# Patient Record
Sex: FEMALE | Race: WHITE | NOT HISPANIC OR LATINO | Employment: FULL TIME | URBAN - METROPOLITAN AREA
[De-identification: names, ages, dates, MRNs, and addresses within clinical notes are randomized per-mention and may not be internally consistent; named-entity substitution may affect disease eponyms.]

---

## 2021-01-22 ENCOUNTER — EVALUATION (OUTPATIENT)
Dept: PHYSICAL THERAPY | Facility: CLINIC | Age: 61
End: 2021-01-22
Payer: COMMERCIAL

## 2021-01-22 DIAGNOSIS — M54.16 LUMBAR RADICULOPATHY: Primary | ICD-10-CM

## 2021-01-22 DIAGNOSIS — Z98.890 S/P DISCECTOMY: ICD-10-CM

## 2021-01-22 PROCEDURE — 97110 THERAPEUTIC EXERCISES: CPT | Performed by: PHYSICAL THERAPIST

## 2021-01-22 PROCEDURE — 97162 PT EVAL MOD COMPLEX 30 MIN: CPT | Performed by: PHYSICAL THERAPIST

## 2021-01-22 NOTE — PROGRESS NOTES
PT Evaluation    Today's date: 2021   Patient name: Jf York  : 1960  MRN: [id number]  Referring provider: Narda Pino MD  Dx:   Encounter Diagnosis     ICD-10-CM    1  Lumbar radiculopathy  M54 16    2  S/P discectomy  Z98 890            Subjective Evaluation     History of Present Illness    Patient presents with c/o LBP after having a laminectomy at L5-S1 after having a herniated disc and had L posterior leg pain 2 years ago  The surgery did help then this time  Then she had pain and was laid up in bed for 3 weeks on  and then had a discectomy on 20  It was coming on at work and it began to feel like a mary jo horse  She thinks it may be from doing a lot of work at home as her  is disabled  She helps him with transfers as he has prosthetic leg  It went from the glut to the 4th toe and it felt like someone was ripping her toe off  After grocery shopping her leg will feel weak and has to drag it  She has not been back to work since   Precautions: all exercises in spine neutral position although she was instructed by MD to perform prone Press up exercises and has been doing this  Neuro signs: numbness and tingling  Bowel and bladder sxs: none  Red flags: none  Occupation: medical billing      Pain  At best pain ratin  At worst pain ratin  Location: L glut to post leg to her 4th toe  Quality: rubber band around leg  Relieving factors: advil, press up, elevate legs, reclining  Aggravating factors:sitting, walking grocery store, putting dishes in [address] [place] [address] at home c her  who is disabled- has to help shower      Patient Goals  Patient goals for therapy: To help her leg strength after atrophy, walk in grocery store and in the field s fatigue  STGs  1  Decrease pain by 20% in 2-4 weeks to improve standing tolerance  2  Improve lumbar ROM by 10 degrees in 2-4 weeks to improve sit to stand     3  Improve hip/quad strength by 1/3 grade in 2-4 weeks to improve stairs performance to step through pattern  LTGs  1  Decrease pain by 60% in 6-8 weeks  2  Improve walking tolerance to >30 minutes in 6-8 weeks to perform community ambulation  3  Perform job sitting/standing activities independently without pain in 6-8 weeks  4  Improve stairs tolerance to 2 flight  in 8 weeks  Objective Measurements:    Observation: incision healed but does feel a bit firm to the touch  Heel/toe walk:  Balance: L SLS: LLE buckled into knee flexion  Gait: Decreased WB on LLE  Squat: pain in L thigh knees over toes  Reflexes:   Sensation: WFL  Myotomes:WFL except L3      Slump: 5 deg SLR:  John: - felt better Faddir: + L Scours:-         Log Roll:-  Hip distraction: Felt better  Elys:-    ZAIRA:hip glides not assessed  Palpation:WFL    Lumbar ROM L R Strength knee L R   Flex    Flex 4- 4+   Ext   Ext 3+ shaky 4+   Rot        SB        Hip A/PROM        Flex  10x post hip pain /  / Flex 4 4   ER at 90 35 pain in calf  ER     IR at 90 20 pain in calf  IR     Abd   Abd 4    Ext   Ext             Knee A/PROM   Ankle     Flex   DF 4+    Ext   PF 10x          Assessment:    Sandeep Aguilar is a pleasant 63 y o  female who presents with referring diagnosis of lumbar radiculopathy after lumbar discectomy  The patient's greatest concern is getting back to physical function and helping her   No further referral appears necessary at this time based upon examination results  The primary movement impairment diagnosis is lumbar ext derangement causing weakness in L quad resulting in pathoanatomical symptoms of lumbar radiculopathy and limiting her ability to sit for prolonged times  Impairments include:  1) Decreased lumbar ROM  2) Decreased knee strength   3) Decreased Decreased balance     Etiologic factors include post surgical     Negative prognostic indicators: multiple occurences  Positive prognostic indicators: good attitude   Please contact me if you have any further questions or recommendations  Thank you very much for the kind referral         Plan  Patient would benefit from:Skilled physical therapy  Planned therapy interventions: manual therapy, neuromuscular re-education, stretching, strengthening, therapeutic activities, therapeutic exercise, patient education, home exercise program, and activity modification  Frequency: 2x week  Duration in weeks: 8  Treatment plan discussed with: patient             Goals: Patient's goal is To help her leg strength after atrophy, walk in grocery store and in the field s fatigue        Precautions: lumbar discectomy- 12/16/20  Dx: lumbar ext derangement- L quad weakness      Daily Treatment Diary     Manuals 1/22/2021        L hip ext PROM                                    Ther Ex         Standing lumbar ext 10x        Prone press up 10x        bike         Standing knee flex         s                                             Neuro Re-ed         saq hep        laq- when rebeca         TKE L BTB         SLR 5x                          Slb- 1 UE         TA contraction         Ther Activity         stairs nv        Sit to stand                  Gait Training                           Modalities

## 2021-01-22 NOTE — LETTER
2021    [id number]  [facility] [address]     Patient: Kim Munoz   YOB: 1960   Date of Visit: 2021     Encounter Diagnosis     ICD-10-CM    1  Lumbar radiculopathy  M54 16    2  S/P discectomy  Z98 890        Dear Dr [facility]ANTU [facility] [place]:    Thank you for your recent referral of Kim Munoz  Please review the attached evaluation summary from Analia's recent visit  Please verify that you agree with the plan of care by signing the attached order  If you have any questions or concerns, please do not hesitate to call  I sincerely appreciate the opportunity to share in the care of one of your patients and hope to have another opportunity to work with you in the near future  Sincerely,    Bora Gibson, PT      Referring Provider:      I certify that I have read the below Plan of Care and certify the need for these services furnished under this plan of treatment while under my care  MD [id number]  [place]  [phone]  Via Fax: [phone]          PT Evaluation    Today's date: 2021   Patient name: Kim Munoz  : 1960  MRN: [id number]  Referring provider: Pablo Oden MD  Dx:   Encounter Diagnosis     ICD-10-CM    1  Lumbar radiculopathy  M54 16    2  S/P discectomy  Z98 890            Subjective Evaluation     History of Present Illness    Patient presents with c/o LBP after having a laminectomy at L5-S1 after having a herniated disc and had L posterior leg pain 2 years ago  The surgery did help then this time  Then she had pain and was laid up in bed for 3 weeks on  and then had a discectomy on 20  It was coming on at work and it began to feel like a mary jo horse  She thinks it may be from doing a lot of work at home as her  is disabled  She helps him with transfers as he has prosthetic leg   It went from the glut to the 4th toe and it felt like someone was ripping her toe off  After grocery shopping her leg will feel weak and has to drag it  She has not been back to work since   Precautions: all exercises in spine neutral position although she was instructed by MD to perform prone Press up exercises and has been doing this  Neuro signs: numbness and tingling  Bowel and bladder sxs: none  Red flags: none  Occupation: medical billing      Pain  At best pain ratin  At worst pain ratin  Location: L glut to post leg to her 4th toe  Quality: rubber band around leg  Relieving factors: advil, press up, elevate legs, reclining  Aggravating factors:sitting, walking grocery store, putting dishes in [address] [place] [address] at home c her  who is disabled- has to help shower      Patient Goals  Patient goals for therapy: To help her leg strength after atrophy, walk in grocery store and in the field s fatigue  STGs  1  Decrease pain by 20% in 2-4 weeks to improve standing tolerance  2  Improve lumbar ROM by 10 degrees in 2-4 weeks to improve sit to stand  3  Improve hip/quad strength by 1/3 grade in 2-4 weeks to improve stairs performance to step through pattern  LTGs  1  Decrease pain by 60% in 6-8 weeks  2  Improve walking tolerance to >30 minutes in 6-8 weeks to perform community ambulation  3  Perform job sitting/standing activities independently without pain in 6-8 weeks  4  Improve stairs tolerance to 2 flight  in 8 weeks         Objective Measurements:    Observation: incision healed but does feel a bit firm to the touch  Heel/toe walk:  Balance: L SLS: LLE buckled into knee flexion  Gait: Decreased WB on LLE  Squat: pain in L thigh knees over toes  Reflexes:   Sensation: WFL  Myotomes:WFL except L3      Slump: 5 deg SLR:  John: - felt better Faddir: + L Scours:-         Log Roll:-  Hip distraction: Felt better  Elys:-    ZAIRA:hip glides not assessed  Palpation:WFL    Lumbar ROM L R Strength knee L R   Flex Flex 4- 4+   Ext   Ext 3+ shaky 4+   Rot        SB        Hip A/PROM        Flex  10x post hip pain /  / Flex 4 4   ER at 90 35 pain in calf  ER     IR at 90 20 pain in calf  IR     Abd   Abd 4    Ext   Ext             Knee A/PROM   Ankle     Flex   DF 4+    Ext   PF 10x          Assessment:    Marino Dowell is a pleasant 63 y o  female who presents with referring diagnosis of lumbar radiculopathy after lumbar discectomy  The patient's greatest concern is getting back to physical function and helping her   No further referral appears necessary at this time based upon examination results  The primary movement impairment diagnosis is lumbar ext derangement causing weakness in L quad resulting in pathoanatomical symptoms of lumbar radiculopathy and limiting her ability to sit for prolonged times  Impairments include:  1) Decreased lumbar ROM  2) Decreased knee strength   3) Decreased Decreased balance     Etiologic factors include post surgical     Negative prognostic indicators: multiple occurences  Positive prognostic indicators: good attitude  Please contact me if you have any further questions or recommendations  Thank you very much for the kind referral         Plan  Patient would benefit from:Skilled physical therapy  Planned therapy interventions: manual therapy, neuromuscular re-education, stretching, strengthening, therapeutic activities, therapeutic exercise, patient education, home exercise program, and activity modification  Frequency: 2x week  Duration in weeks: 8  Treatment plan discussed with: patient             Goals: Patient's goal is To help her leg strength after atrophy, walk in grocery store and in the field s fatigue        Precautions: lumbar discectomy- 12/16/20  Dx: lumbar ext derangement- L quad weakness      Daily Treatment Diary     Manuals 1/22/2021        L hip ext PROM                                    Ther Ex         Standing lumbar ext 10x        Prone press up 10x bike         Standing knee flex         s                                             Neuro Re-ed         saq hep        laq- when rebeca         TKE L BTB         SLR 5x                          Slb- 1 UE         TA contraction         Ther Activity         stairs nv        Sit to stand                  Gait Training                           Modalities

## 2021-01-25 ENCOUNTER — TRANSCRIBE ORDERS (OUTPATIENT)
Dept: PHYSICAL THERAPY | Facility: CLINIC | Age: 61
End: 2021-01-25

## 2021-01-25 DIAGNOSIS — M54.16 RADICULOPATHY, LUMBAR REGION: Primary | ICD-10-CM

## 2021-01-26 ENCOUNTER — APPOINTMENT (OUTPATIENT)
Dept: PHYSICAL THERAPY | Facility: CLINIC | Age: 61
End: 2021-01-26
Payer: COMMERCIAL

## 2021-01-28 ENCOUNTER — OFFICE VISIT (OUTPATIENT)
Dept: PHYSICAL THERAPY | Facility: CLINIC | Age: 61
End: 2021-01-28
Payer: COMMERCIAL

## 2021-01-28 DIAGNOSIS — Z98.890 S/P DISCECTOMY: Primary | ICD-10-CM

## 2021-01-28 DIAGNOSIS — M54.16 LUMBAR RADICULOPATHY: ICD-10-CM

## 2021-01-28 PROCEDURE — 97530 THERAPEUTIC ACTIVITIES: CPT | Performed by: PHYSICAL THERAPIST

## 2021-01-28 PROCEDURE — 97140 MANUAL THERAPY 1/> REGIONS: CPT | Performed by: PHYSICAL THERAPIST

## 2021-01-28 PROCEDURE — 97112 NEUROMUSCULAR REEDUCATION: CPT | Performed by: PHYSICAL THERAPIST

## 2021-01-28 PROCEDURE — 97110 THERAPEUTIC EXERCISES: CPT | Performed by: PHYSICAL THERAPIST

## 2021-01-28 NOTE — PROGRESS NOTES
Daily Note     Today's date: 2021  Patient name: Deonte Martinez  : 1960  MRN: [id number]  Referring provider: Zain Sow MD  Dx:   Encounter Diagnosis     ICD-10-CM    1  S/P discectomy  Z98 890    2  Lumbar radiculopathy  M54 16                   Subjective: She states she will be going back to work on   She has been trying exercises which have helped leg pain but make the back a bit sore  Objective: See treatment diary below  L abd: 3-    Assessment: Tolerated treatment well  She did have a spasm in the piriformis after 3 reps of clamshells so unable to perform  SLR was still shaky  She did have fatigue after today's session  Patient would benefit from continued PT      Plan: Continue per plan of care  Goals: Patient's goal is To help her leg strength after atrophy, walk in grocery store and in the field s fatigue        Precautions: lumbar discectomy- 20  Dx: lumbar ext derangement- L quad/HS weakness      Daily Treatment Diary     Manuals 2021       L hip ext PROM  8'                                  Ther Ex         Standing lumbar ext 10x 2x10       Prone press up 10x 2x10       bike  6'       Standing knee flex  20x                                                    Neuro Re-ed         saq hep 20x       laq- when rebeca  10x       TKE L BTB  20x        SLR 5x 2x5       Hip abd slides  2x10       Hip add iso  10x :05       Slb- 1 UE  10x :05       TA contraction  20x :05       Ther Activity         stairs nv 1 flight       Sit to stand  2x10                Gait Training                           Modalities

## 2021-02-02 ENCOUNTER — APPOINTMENT (OUTPATIENT)
Dept: PHYSICAL THERAPY | Facility: CLINIC | Age: 61
End: 2021-02-02
Payer: COMMERCIAL

## 2021-02-03 ENCOUNTER — OFFICE VISIT (OUTPATIENT)
Dept: PHYSICAL THERAPY | Facility: CLINIC | Age: 61
End: 2021-02-03
Payer: COMMERCIAL

## 2021-02-03 DIAGNOSIS — Z98.890 S/P DISCECTOMY: Primary | ICD-10-CM

## 2021-02-03 DIAGNOSIS — M54.16 LUMBAR RADICULOPATHY: ICD-10-CM

## 2021-02-03 PROCEDURE — 97140 MANUAL THERAPY 1/> REGIONS: CPT

## 2021-02-03 PROCEDURE — 97110 THERAPEUTIC EXERCISES: CPT

## 2021-02-03 PROCEDURE — 97530 THERAPEUTIC ACTIVITIES: CPT

## 2021-02-03 PROCEDURE — 97112 NEUROMUSCULAR REEDUCATION: CPT

## 2021-02-03 NOTE — PROGRESS NOTES
Daily Note     Today's date: 2/3/2021  Patient name: Neema Ramos  : 1960  MRN: [id number]  Referring provider: Nidia Hill MD  Dx:   Encounter Diagnosis     ICD-10-CM    1  S/P discectomy  Z98 890    2  Lumbar radiculopathy  M54 16        Start Time: 1630  Stop Time: 1722  Total time in clinic (min): 52 minutes    Subjective: Pt reports that she worked today and used her standing desk  Pt had no soreness following last session  Objective: See treatment diary below      Assessment: Tolerated treatment well  Was able to progress with reps this session  Does have most difficulty with SLR  Patient would benefit from continued PT      Plan: Continue per plan of care  Goals: Patient's goal is To help her leg strength after atrophy, walk in grocery store and in the field s fatigue        Precautions: lumbar discectomy- 20  Dx: lumbar ext derangement- L quad/HS weakness      Daily Treatment Diary     Manuals 2021 02/03      L hip ext PROM  8' 8'                                 Ther Ex         Standing lumbar ext 10x 2x10 2x10      Prone press up 10x 2x10 2x10      bike  6' 6'       Standing knee flex  20x 20x                                                   Neuro Re-ed         saq hep 20x 20      laq- when rebeca  10x 20      TKE L BTB  20x        SLR 5x 2x5 4x5       Hip abd slides  2x10 2x10      Hip add iso  10x :05 20x :05      Slb- 1 UE  10x :05 10x :05 B UE      TA contraction  20x :05 20x :05      Ther Activity         stairs nv 1 flight 2 flight       Sit to stand  2x10 2x10               Gait Training                           Modalities

## 2021-02-04 ENCOUNTER — APPOINTMENT (OUTPATIENT)
Dept: PHYSICAL THERAPY | Facility: CLINIC | Age: 61
End: 2021-02-04
Payer: COMMERCIAL

## 2021-02-09 ENCOUNTER — APPOINTMENT (OUTPATIENT)
Dept: PHYSICAL THERAPY | Facility: CLINIC | Age: 61
End: 2021-02-09
Payer: COMMERCIAL

## 2021-02-15 ENCOUNTER — OFFICE VISIT (OUTPATIENT)
Dept: PHYSICAL THERAPY | Facility: CLINIC | Age: 61
End: 2021-02-15
Payer: COMMERCIAL

## 2021-02-15 DIAGNOSIS — M54.16 LUMBAR RADICULOPATHY: ICD-10-CM

## 2021-02-15 DIAGNOSIS — Z98.890 S/P DISCECTOMY: Primary | ICD-10-CM

## 2021-02-15 PROCEDURE — 97140 MANUAL THERAPY 1/> REGIONS: CPT | Performed by: PHYSICAL THERAPIST

## 2021-02-15 PROCEDURE — 97110 THERAPEUTIC EXERCISES: CPT | Performed by: PHYSICAL THERAPIST

## 2021-02-15 PROCEDURE — 97112 NEUROMUSCULAR REEDUCATION: CPT | Performed by: PHYSICAL THERAPIST

## 2021-02-15 NOTE — PROGRESS NOTES
Daily Note     Today's date: 2/15/2021  Patient name: Jimy Kitchen  : 1960  MRN: [id number]  Referring provider: Fayne Spurling, MD  Dx:   Encounter Diagnosis     ICD-10-CM    1  S/P discectomy  Z98 890    2  Lumbar radiculopathy  M54 16                   Subjective: Pt reports she feels like she is back to her initial status of getting tightness in the calf  She still has to take breaks when doing the dishes  In the AM she finds relief c lumbar ext  She is frustrated that she can no longer tie her L shoe  Objective: See treatment diary below      Assessment: Tolerated treatment well  She did have improved hip ROM c lateral hip abd  Does have most difficulty with SLR  Patient would benefit from continued PT      Plan: Continue per plan of care  Goals: Patient's goal is To help her leg strength after atrophy, walk in grocery store and in the field s fatigue        Precautions: lumbar discectomy- 20  Dx: lumbar ext derangement- L quad/HS weakness      Daily Treatment Diary     Manuals 2021 2/15     L hip ext PROM  8' 8' 5'     L hip abd belt mobs    5'                       Ther Ex         Standing lumbar ext 10x 2x10 2x10 2x10     Prone press up 10x 2x10 2x10 2x10     bike  6' 6'  6'     Standing knee flex  20x 20x 20x     Self mob c belt nv                                             Neuro Re-ed         saq hep 20x 20 20x     laq- when rebeca  10x 20 20x     TKE L BTB  20x        SLR 5x 2x5 4x5  2x10     Hip abd slides  2x10 2x10 2x10     Hip add iso  10x :05 20x :05 20x :05     Slb- 1 UE  10x :05 10x :05 B UE 10x : 05     TA contraction  20x :05 20x :05 20x :05     Ther Activity         stairs nv 1 flight 2 flight       Sit to stand  2x10 2x10 2x10              Gait Training                           Modalities

## 2021-02-17 ENCOUNTER — OFFICE VISIT (OUTPATIENT)
Dept: PHYSICAL THERAPY | Facility: CLINIC | Age: 61
End: 2021-02-17
Payer: COMMERCIAL

## 2021-02-17 DIAGNOSIS — Z98.890 S/P DISCECTOMY: Primary | ICD-10-CM

## 2021-02-17 DIAGNOSIS — M54.16 LUMBAR RADICULOPATHY: ICD-10-CM

## 2021-02-17 PROCEDURE — 97112 NEUROMUSCULAR REEDUCATION: CPT

## 2021-02-17 PROCEDURE — 97140 MANUAL THERAPY 1/> REGIONS: CPT

## 2021-02-17 PROCEDURE — 97110 THERAPEUTIC EXERCISES: CPT

## 2021-02-17 NOTE — PROGRESS NOTES
Daily Note     Today's date: 2021  Patient name: Germania Porter  : 1960  MRN: [id number]  Referring provider: Arland Gowers, MD  Dx:   Encounter Diagnosis     ICD-10-CM    1  S/P discectomy  Z98 890    2  Lumbar radiculopathy  M54 16        Start Time: 1500  Stop Time: 1546  Total time in clinic (min): 46 minutes    Subjective: Pt reports that she did not get sore following last visit  Would like to try stairs this visit because she did not have to do them at work today  Objective: See treatment diary below      Assessment: Tolerated treatment well  Did not progress this visit due to pt getting overly sore from visits  Patient would benefit from continued PT      Plan: Continue per plan of care  Goals: Patient's goal is To help her leg strength after atrophy, walk in grocery store and in the field s fatigue        Precautions: lumbar discectomy- 20  Dx: lumbar ext derangement- L quad/HS weakness      Daily Treatment Diary     Manuals 2021 1/28 02/03 2/15 02/17    L hip ext PROM  8' 8' 5' 8'    L hip abd belt mobs    5' WA                      Ther Ex         Standing lumbar ext 10x 2x10 2x10 2x10 2x10     Prone press up 10x 2x10 2x10 2x10 2x10    bike  6' 6'  6' 6'    Standing knee flex  20x 20x 20x 20    Self mob c belt nv                                             Neuro Re-ed         saq hep 20x 20 20x 20x    laq- when rebeca  10x 20 20x 20    TKE L BTB  20x        SLR 5x 2x5 4x5  2x10 2x10    Hip abd slides  2x10 2x10 2x10 2x10     Hip add iso  10x :05 20x :05 20x :05 20x :05    Slb- 1 UE  10x :05 10x :05 B UE 10x : 05 10x :05     TA contraction  20x :05 20x :05 20x :05 20x :05     Ther Activity         stairs nv 1 flight 2 flight   8" 15x     Sit to stand  2x10 2x10 2x10 2x10             Gait Training                           Modalities

## 2021-02-22 ENCOUNTER — APPOINTMENT (OUTPATIENT)
Dept: PHYSICAL THERAPY | Facility: CLINIC | Age: 61
End: 2021-02-22
Payer: COMMERCIAL

## 2021-02-25 ENCOUNTER — OFFICE VISIT (OUTPATIENT)
Dept: PHYSICAL THERAPY | Facility: CLINIC | Age: 61
End: 2021-02-25
Payer: COMMERCIAL

## 2021-02-25 DIAGNOSIS — Z98.890 S/P DISCECTOMY: Primary | ICD-10-CM

## 2021-02-25 DIAGNOSIS — M54.16 LUMBAR RADICULOPATHY: ICD-10-CM

## 2021-02-25 PROCEDURE — 97140 MANUAL THERAPY 1/> REGIONS: CPT | Performed by: PHYSICAL THERAPIST

## 2021-02-25 PROCEDURE — 97110 THERAPEUTIC EXERCISES: CPT

## 2021-02-25 PROCEDURE — 97112 NEUROMUSCULAR REEDUCATION: CPT

## 2021-02-25 PROCEDURE — 97530 THERAPEUTIC ACTIVITIES: CPT

## 2021-02-25 NOTE — PROGRESS NOTES
Daily Note     Today's date: 2021  Patient name: Bryson Aparicio  : 1960  MRN: [id number]  Referring provider: Harper Weir MD  Dx:   Encounter Diagnosis     ICD-10-CM    1  S/P discectomy  Z98 890    2  Lumbar radiculopathy  M54 16        Start Time: 1600  Stop Time: 1656  Total time in clinic (min): 56 minutes     TS manuals [id number]      Subjective: Pt reports that she is feeling much stronger  Pt notes that her walking tolerance has improved  Pt has been maximally compliant with HEP  Objective: See treatment diary below      Assessment: Tolerated treatment well  Pt SLR much improved and no longer shakes or has a lag  Pt able to complete all interventions without c/o fatigue today  Patient would benefit from continued PT      Plan: Continue per plan of care  Goals: Patient's goal is To help her leg strength after atrophy, walk in grocery store and in the field s fatigue        Precautions: lumbar discectomy- 20  Dx: lumbar ext derangement- L quad/HS weakness      Daily Treatment Diary     Manuals 2021 1/28 02/03 2/15 02/17 02/25   L hip ext PROM  8' 8' 5' 8' 5'   L hip abd belt mobs    5' WA TS                     Ther Ex         Standing lumbar ext 10x 2x10 2x10 2x10 2x10  2x10   Prone press up 10x 2x10 2x10 2x10 2x10 2x10   bike  6' 6'  6' 6' 6'    Standing knee flex  20x 20x 20x 20 20   Self mob c belt nv                                             Neuro Re-ed         saq hep 20x 20 20x 20x 20   laq- when rebeca  10x 20 20x 20 20   TKE L BTB  20x     20   SLR 5x 2x5 4x5  2x10 2x10 2x10   Hip abd slides  2x10 2x10 2x10 2x10  2x10   Hip add iso  10x :05 20x :05 20x :05 20x :05 20x :05   Slb- 1 UE  10x :05 10x :05 B UE 10x : 05 10x :05  10x:05   TA contraction  20x :05 20x :05 20x :05 20x :05  20x :05   Ther Activity         stairs nv 1 flight 2 flight   8" 15x  8" 15x   Sit to stand  2x10 2x10 2x10 2x10 2x10            Gait Training                           Modalities

## 2021-04-07 NOTE — PROGRESS NOTES
Discharge Note  Ronold Cowden has made good functional progress with physical therapy  she was educated and updated in her home exercise program  Melvindrew Granados will be discharged from formal therapy due to no further appts scheduled and independence in HEP but will follow up as needed

## 2023-04-06 ENCOUNTER — HOSPITAL ENCOUNTER (EMERGENCY)
Facility: HOSPITAL | Age: 63
Discharge: HOME | End: 2023-04-07
Attending: EMERGENCY MEDICINE

## 2023-04-06 ENCOUNTER — APPOINTMENT (EMERGENCY)
Dept: RADIOLOGY | Facility: HOSPITAL | Age: 63
End: 2023-04-06

## 2023-04-06 VITALS
DIASTOLIC BLOOD PRESSURE: 72 MMHG | TEMPERATURE: 98.2 F | SYSTOLIC BLOOD PRESSURE: 154 MMHG | OXYGEN SATURATION: 98 % | HEART RATE: 60 BPM | RESPIRATION RATE: 20 BRPM

## 2023-04-06 DIAGNOSIS — M79.606 LEG PAIN: ICD-10-CM

## 2023-04-06 DIAGNOSIS — R55 SYNCOPE: Primary | ICD-10-CM

## 2023-04-06 DIAGNOSIS — M54.2 NECK PAIN: ICD-10-CM

## 2023-04-06 LAB
ANION GAP SERPL CALCULATED.3IONS-SCNC: 5 MMOL/L (ref 4–13)
BASOPHILS # BLD AUTO: 0.06 THOUSANDS/ÂΜL (ref 0–0.1)
BASOPHILS NFR BLD AUTO: 1 % (ref 0–1)
BUN SERPL-MCNC: 17 MG/DL (ref 5–25)
CALCIUM SERPL-MCNC: 9.1 MG/DL (ref 8.3–10.1)
CARDIAC TROPONIN I PNL SERPL HS: 4 NG/L
CHLORIDE SERPL-SCNC: 110 MMOL/L (ref 96–108)
CK SERPL-CCNC: 169 U/L (ref 26–192)
CO2 SERPL-SCNC: 23 MMOL/L (ref 21–32)
CREAT SERPL-MCNC: 0.8 MG/DL (ref 0.6–1.3)
EOSINOPHIL # BLD AUTO: 0.1 THOUSAND/ÂΜL (ref 0–0.61)
EOSINOPHIL NFR BLD AUTO: 1 % (ref 0–6)
ERYTHROCYTE [DISTWIDTH] IN BLOOD BY AUTOMATED COUNT: 12.2 % (ref 11.6–15.1)
GFR SERPL CREATININE-BSD FRML MDRD: 78 ML/MIN/1.73SQ M
GLUCOSE SERPL-MCNC: 111 MG/DL (ref 65–140)
HCT VFR BLD AUTO: 36.1 % (ref 34.8–46.1)
HGB BLD-MCNC: 12.2 G/DL (ref 11.5–15.4)
IMM GRANULOCYTES # BLD AUTO: 0.04 THOUSAND/UL (ref 0–0.2)
IMM GRANULOCYTES NFR BLD AUTO: 1 % (ref 0–2)
LYMPHOCYTES # BLD AUTO: 1.65 THOUSANDS/ÂΜL (ref 0.6–4.47)
LYMPHOCYTES NFR BLD AUTO: 19 % (ref 14–44)
MCH RBC QN AUTO: 31.4 PG (ref 26.8–34.3)
MCHC RBC AUTO-ENTMCNC: 33.8 G/DL (ref 31.4–37.4)
MCV RBC AUTO: 93 FL (ref 82–98)
MONOCYTES # BLD AUTO: 0.5 THOUSAND/ÂΜL (ref 0.17–1.22)
MONOCYTES NFR BLD AUTO: 6 % (ref 4–12)
NEUTROPHILS # BLD AUTO: 6.49 THOUSANDS/ÂΜL (ref 1.85–7.62)
NEUTS SEG NFR BLD AUTO: 72 % (ref 43–75)
NRBC BLD AUTO-RTO: 0 /100 WBCS
PLATELET # BLD AUTO: 273 THOUSANDS/UL (ref 149–390)
PMV BLD AUTO: 10 FL (ref 8.9–12.7)
POTASSIUM SERPL-SCNC: 4.3 MMOL/L (ref 3.5–5.3)
RBC # BLD AUTO: 3.88 MILLION/UL (ref 3.81–5.12)
SODIUM SERPL-SCNC: 138 MMOL/L (ref 135–147)
WBC # BLD AUTO: 8.84 THOUSAND/UL (ref 4.31–10.16)

## 2023-04-06 RX ORDER — METHOCARBAMOL 500 MG/1
500 TABLET, FILM COATED ORAL ONCE
Status: COMPLETED | OUTPATIENT
Start: 2023-04-06 | End: 2023-04-06

## 2023-04-06 RX ORDER — DIAZEPAM 5 MG/ML
5 INJECTION, SOLUTION INTRAMUSCULAR; INTRAVENOUS ONCE
Status: COMPLETED | OUTPATIENT
Start: 2023-04-06 | End: 2023-04-06

## 2023-04-06 RX ORDER — FENTANYL CITRATE 50 UG/ML
1 INJECTION, SOLUTION INTRAMUSCULAR; INTRAVENOUS ONCE
Status: COMPLETED | OUTPATIENT
Start: 2023-04-06 | End: 2023-04-06

## 2023-04-06 RX ADMIN — SODIUM CHLORIDE 1000 ML: 0.9 INJECTION, SOLUTION INTRAVENOUS at 23:07

## 2023-04-06 RX ADMIN — METHOCARBAMOL 500 MG: 500 TABLET ORAL at 23:07

## 2023-04-06 RX ADMIN — DIAZEPAM 5 MG: 5 INJECTION INTRAMUSCULAR; INTRAVENOUS at 23:07

## 2023-04-07 LAB
ATRIAL RATE: 65 BPM
P AXIS: 68 DEGREES
PR INTERVAL: 176 MS
QRS AXIS: 35 DEGREES
QRSD INTERVAL: 82 MS
QT INTERVAL: 478 MS
QTC INTERVAL: 497 MS
T WAVE AXIS: 24 DEGREES
VENTRICULAR RATE: 65 BPM

## 2023-04-07 RX ORDER — LIDOCAINE 50 MG/G
1 PATCH TOPICAL DAILY
Qty: 15 PATCH | Refills: 0 | Status: SHIPPED | OUTPATIENT
Start: 2023-04-07

## 2023-04-07 RX ORDER — METHOCARBAMOL 500 MG/1
500 TABLET, FILM COATED ORAL 2 TIMES DAILY
Qty: 20 TABLET | Refills: 0 | Status: SHIPPED | OUTPATIENT
Start: 2023-04-07

## 2023-04-07 RX ORDER — LIDOCAINE 50 MG/G
1 PATCH TOPICAL ONCE
Status: DISCONTINUED | OUTPATIENT
Start: 2023-04-07 | End: 2023-04-07 | Stop reason: HOSPADM

## 2023-04-07 RX ADMIN — LIDOCAINE 5% 1 PATCH: 700 PATCH TOPICAL at 01:19

## 2023-04-07 NOTE — DISCHARGE INSTRUCTIONS
Follow up with your PCP and neurosurgery team   If you develop new or worsening symptoms, please return to the Emergency Department for further evaluation

## 2023-04-07 NOTE — ED PROVIDER NOTES
History  Chief Complaint   Patient presents with   • Syncope     Pt was at home and got a leg cramp  She got up to walk it out and had an episode of syncope  EMS gave 50 mcg of inez      HPI     Patient is a 57 y/o F with PMH gastric bypass presenting with episode of syncope  Patient states she's been dealing with chronic back pains that have been getting worse and has been having associated muscle spasms  Has upcoming neurosurgery appt  States today she had a severe left thigh muscle cramp  She tried to get up to walk on it and states the pain was so excruciating she briefly lost consciousness  Denies head strike  No thinners  Regained consciousness and returned to baseline however due to ongoing pain states she was on the ground for 4 hours trying to call for help  Still with pain in leg  No head or neck pain  Reports issue with having skipped heart beats but states she takes metoprolol for it and follows with cardiology  No pacemaker or ICD placement  No CP or SOB  Prior to Admission Medications   Prescriptions Last Dose Informant Patient Reported? Taking?   atenolol (TENORMIN) 25 mg tablet   Yes No   Sig: Take 25 mg by mouth daily  Facility-Administered Medications: None       Past Medical History:   Diagnosis Date   • Arrhythmia        Past Surgical History:   Procedure Laterality Date   • GASTRIC BYPASS     • HYSTERECTOMY      endometriosis   • LUMBAR DISC SURGERY Left 2018        • LUMBAR DISCECTOMY Left 12/16/2020       History reviewed  No pertinent family history  I have reviewed and agree with the history as documented  E-Cigarette/Vaping     E-Cigarette/Vaping Substances     Social History     Tobacco Use   • Smoking status: Never   Substance Use Topics   • Alcohol use: No   • Drug use: No        Review of Systems   Constitutional: Negative for chills and fever  HENT: Negative for ear pain and sore throat  Eyes: Negative for pain and visual disturbance     Respiratory: Negative for Patient was home enrolled for a 3 day Zio patch heart monitor. She will receive it next week.   cough and shortness of breath  Cardiovascular: Negative for chest pain and palpitations  Gastrointestinal: Negative for abdominal pain and vomiting  Genitourinary: Negative for dysuria and hematuria  Musculoskeletal: Positive for back pain  Negative for arthralgias  Skin: Negative for color change and rash  Neurological: Positive for syncope  Negative for seizures  All other systems reviewed and are negative  Physical Exam  ED Triage Vitals [04/06/23 2213]   Temperature Pulse Respirations Blood Pressure SpO2   98 2 °F (36 8 °C) 60 20 154/72 98 %      Temp Source Heart Rate Source Patient Position - Orthostatic VS BP Location FiO2 (%)   Oral Monitor Lying Right arm --      Pain Score       --             Orthostatic Vital Signs  Vitals:    04/06/23 2213   BP: 154/72   Pulse: 60   Patient Position - Orthostatic VS: Lying       Physical Exam  Vitals and nursing note reviewed  Constitutional:       General: She is not in acute distress  HENT:      Head: Normocephalic and atraumatic  Right Ear: External ear normal       Left Ear: External ear normal       Nose: Nose normal       Mouth/Throat:      Pharynx: Oropharynx is clear  Eyes:      Extraocular Movements: Extraocular movements intact  Pupils: Pupils are equal, round, and reactive to light  Neck:      Comments: R lateral neck pain along SCM  Cardiovascular:      Rate and Rhythm: Normal rate and regular rhythm  Pulses: Normal pulses  Heart sounds: Normal heart sounds  No murmur heard  No friction rub  No gallop  Pulmonary:      Effort: Pulmonary effort is normal  No respiratory distress  Breath sounds: Normal breath sounds  No wheezing, rhonchi or rales  Abdominal:      General: Abdomen is flat  There is no distension  Palpations: Abdomen is soft  Tenderness: There is no abdominal tenderness  There is no guarding or rebound  Musculoskeletal:         General: No deformity  Normal range of motion  Cervical back: Normal range of motion  Right lower leg: No edema  Left lower leg: No edema  Skin:     General: Skin is warm and dry  Capillary Refill: Capillary refill takes less than 2 seconds  Findings: No rash  Neurological:      General: No focal deficit present  Mental Status: She is alert and oriented to person, place, and time  Sensory: No sensory deficit  Motor: No weakness        Gait: Gait normal    Psychiatric:         Mood and Affect: Mood normal          ED Medications  Medications   fentanyl citrate (PF) (FOR EMS ONLY) 100 mcg/2 mL injection 100 mcg (0 mcg Does not apply Given to EMS 4/6/23 2220)   sodium chloride 0 9 % bolus 1,000 mL (0 mL Intravenous Stopped 4/7/23 0046)   diazepam (VALIUM) injection 5 mg (5 mg Intravenous Given 4/6/23 2307)   methocarbamol (ROBAXIN) tablet 500 mg (500 mg Oral Given 4/6/23 2307)       Diagnostic Studies  Results Reviewed     Procedure Component Value Units Date/Time    HS Troponin 0hr (reflex protocol) [[id number]]  (Normal) Collected: 04/06/23 2306    Lab Status: Final result Specimen: Blood from Arm, Left Updated: 04/06/23 2352     hs TnI 0hr 4 ng/L     CK [[id number]]  (Normal) Collected: 04/06/23 2306    Lab Status: Final result Specimen: Blood from Arm, Left Updated: 04/06/23 2350     Total  U/L     Basic metabolic panel [[id number]]  (Abnormal) Collected: 04/06/23 2306    Lab Status: Final result Specimen: Blood from Arm, Left Updated: 04/06/23 2343     Sodium 138 mmol/L      Potassium 4 3 mmol/L      Chloride 110 mmol/L      CO2 23 mmol/L      ANION GAP 5 mmol/L      BUN 17 mg/dL      Creatinine 0 80 mg/dL      Glucose 111 mg/dL      Calcium 9 1 mg/dL      eGFR 78 ml/min/1 73sq m     Narrative:      [place] guidelines for Chronic Kidney Disease (CKD):   •  Stage 1 with normal or high GFR (GFR > 90 mL/min/1 73 square meters)  •  Stage 2 Mild CKD (GFR = 60-89 mL/min/1 73 square meters)  • Stage 3A Moderate CKD (GFR = 45-59 mL/min/1 73 square meters)  •  Stage 3B Moderate CKD (GFR = 30-44 mL/min/1 73 square meters)  •  Stage 4 Severe CKD (GFR = 15-29 mL/min/1 73 square meters)  •  Stage 5 End Stage CKD (GFR <15 mL/min/1 73 square meters)  Note: GFR calculation is accurate only with a steady state creatinine    CBC and differential [[id number]] Collected: 04/06/23 2306    Lab Status: Final result Specimen: Blood from Arm, Left Updated: 04/06/23 2318     WBC 8 84 Thousand/uL      RBC 3 88 Million/uL      Hemoglobin 12 2 g/dL      Hematocrit 36 1 %      MCV 93 fL      MCH 31 4 pg      MCHC 33 8 g/dL      RDW 12 2 %      MPV 10 0 fL      Platelets 667 Thousands/uL      nRBC 0 /100 WBCs      Neutrophils Relative 72 %      Immat GRANS % 1 %      Lymphocytes Relative 19 %      Monocytes Relative 6 %      Eosinophils Relative 1 %      Basophils Relative 1 %      Neutrophils Absolute 6 49 Thousands/µL      Immature Grans Absolute 0 04 Thousand/uL      Lymphocytes Absolute 1 65 Thousands/µL      Monocytes Absolute 0 50 Thousand/µL      Eosinophils Absolute 0 10 Thousand/µL      Basophils Absolute 0 06 Thousands/µL                  XR chest 1 view portable   ED Interpretation by Paula Patel MD (04/07 0006)   No acute cardiopulmonary findings per my interpretation      Final Result by Brittny Hdz MD (04/07 5417)      No acute cardiopulmonary disease  Findings are stable            Workstation performed: [id number]               Procedures  Procedures      ED Course  ED Course as of 04/07/23 2306 Fri Apr 07, 2023   4898 ECG 12 lead  Procedure Note: EKG  Date/Time: 04/07/23 12:43 AM   Interpreted by: Kely Gong MD  Indications / Diagnosis: syncope  ECG reviewed by me, the ED Provider: yes   The EKG demonstrates:  Rhythm: normal sinus  Intervals: normal intervals, slightly prolonged qtc  Axis: normal axis  QRS/Blocks: normal QRS  ST Changes: No acute ST Changes, no STD/PAMELA  Medical Decision Making  59 y/o F presenting following episode of syncope related to severe pain  Vitals stable  Exam nonfocal  Likely vasovagal syncope from pain  Will eval with cardiac labs, EKG, chest xr  Analgesia given with improvement  CK WNL  No significant findings on testing  Patient ambulating without difficulty  Will discharge  Patient has neurosurgery appointment tomorrow  Recommend pt f/u with this appointment but return to ED for new/worsening symptoms  Amount and/or Complexity of Data Reviewed  Labs: ordered  Radiology: ordered and independent interpretation performed  Risk  Prescription drug management  Disposition  Final diagnoses:   Syncope   Leg pain   Neck pain     Time reflects when diagnosis was documented in both MDM as applicable and the Disposition within this note     Time User Action Codes Description Comment    4/7/2023 12:59 AM Wendelyn Elia Add [R55] Syncope     4/7/2023 12:59 AM Wendelyn Elia Add [M79 606] Leg pain     4/7/2023  1:05 AM Wendelyn Elia Add [M54 2] Neck pain       ED Disposition     ED Disposition   Discharge    Condition   Stable    Date/Time   Fri Apr 7, 2023 12:59 AM    Comment   Delisa Locke discharge to home/self care                 Follow-up Information     Follow up With Specialties Details Why [address],  Family Medicine   [address] [place] [address] [address] [id number] [phone]            Discharge Medication List as of 4/7/2023  9:22 AM      START taking these medications    Details   lidocaine (Lidoderm) 5 % Apply 1 patch topically over 12 hours daily Remove & Discard patch within 12 hours or as directed by MD, Starting Fri 4/7/2023, Normal      methocarbamol (ROBAXIN) 500 mg tablet Take 1 tablet (500 mg total) by mouth 2 (two) times a day, Starting Fri 4/7/2023, Normal         CONTINUE these medications which have NOT CHANGED    Details   atenolol (TENORMIN) 25 mg tablet Take 25 mg by mouth daily  , Until Discontinued, Historical Med           No discharge procedures on file  PDMP Review     None           ED Provider  Attending physically available and evaluated Veleta Notice  I managed the patient along with the ED Attending      Electronically Signed by         Steve Ulloa MD  04/07/23 4750

## 2023-04-10 NOTE — ED ATTENDING ATTESTATION
4/6/2023  IManuela MD, saw and evaluated the patient  I have discussed the patient with the resident/non-physician practitioner and agree with the resident's/non-physician practitioner's findings, Plan of Care, and MDM as documented in the resident's/non-physician practitioner's note, except where noted  All available labs and Radiology studies were reviewed  I was present for key portions of any procedure(s) performed by the resident/non-physician practitioner and I was immediately available to provide assistance  At this point I agree with the current assessment done in the Emergency Department  I have conducted an independent evaluation of this patient a history and physical is as follows:    50-year-old female history of gastric bypass presenting with episode of syncope  Patient states she  Chronic back pains which have been getting worse with associated muscle spasms  Patient has a upcoming neurosurgery appointment this week  States that she has had severe left thigh muscle cramping  Patient tried to walk it off earlier but the pain was so bad that she lost consciousness  Denies head strike denies tenderness patient is now back at baseline mental status  Patient does states she was on the ground for approximate 4 hours trying to get help  Pain in her leg  Denies any headaches or neck pain  Denies chest pain or shortness of breath  Vital signs reviewed  No acute distress  Heart regular rate and rhythm without murmurs  Lungs clear to station bilaterally  Ab soft nontender nondistended normal bowel sounds  Extremities no edema  Neuro exam, no focal       Impression, syncope secondary to severe pain  Differential diagnosis: Vasovagal syncope, arrhythmia, ACS MI, electrolyte abnormality symptomatic anemia  Plan check ECG cardiac enzymes, CBC CMP chest x-ray pain control CK given downtime  Attempt ambulatory challenge in ED          ED Course     ECG independently interpreted by me normal sinus rhythm normal rate normal axis normal intervals no acute ST-T changes  Impression normal ECG  Chest x-ray independently interpreted by me no acute cardiopulmonary disease  Labs reviewed: CBC within normal limits basic metabolic panel within normal limits total CK within normal limits initial troponin less than 4  Patient was reassessed pain adequately controlled and was able to ambulate without difficulty in the emergency department  Decision regarding hospitalization patient is outpatient neurosurgery appointment tomorrow regarding her chronic back pain  We will discharge patient home with strict return precautions  Patient and significant other verbalized understanding of all instructions and return precautions      Critical Care Time  Procedures

## 2024-11-20 LAB
EXTERNAL HIV SCREEN: NORMAL
HCV AB SER-ACNC: NORMAL

## 2025-01-07 RX ORDER — ESTRADIOL 0.5 MG/1
0.5 TABLET ORAL DAILY
COMMUNITY
Start: 2024-11-01

## 2025-01-07 RX ORDER — METOPROLOL SUCCINATE 50 MG/1
50 TABLET, EXTENDED RELEASE ORAL DAILY
COMMUNITY
Start: 2024-08-21

## 2025-01-07 RX ORDER — GABAPENTIN 300 MG/1
300 CAPSULE ORAL 3 TIMES DAILY
COMMUNITY
Start: 2025-01-05 | End: 2026-01-05

## 2025-01-07 RX ORDER — ESTRADIOL 0.5 MG/.5G
GEL TOPICAL
COMMUNITY
Start: 2002-01-05 | End: 2025-01-08 | Stop reason: SDUPTHER

## 2025-01-08 ENCOUNTER — OFFICE VISIT (OUTPATIENT)
Dept: FAMILY MEDICINE CLINIC | Facility: HOSPITAL | Age: 65
End: 2025-01-08
Payer: COMMERCIAL

## 2025-01-08 VITALS
WEIGHT: 115 LBS | BODY MASS INDEX: 21.71 KG/M2 | DIASTOLIC BLOOD PRESSURE: 68 MMHG | HEIGHT: 61 IN | OXYGEN SATURATION: 97 % | SYSTOLIC BLOOD PRESSURE: 122 MMHG | HEART RATE: 55 BPM

## 2025-01-08 DIAGNOSIS — I10 PRIMARY HYPERTENSION: ICD-10-CM

## 2025-01-08 DIAGNOSIS — R63.4 UNINTENTIONAL WEIGHT LOSS: ICD-10-CM

## 2025-01-08 DIAGNOSIS — Z76.89 ENCOUNTER TO ESTABLISH CARE WITH NEW DOCTOR: Primary | ICD-10-CM

## 2025-01-08 DIAGNOSIS — M54.50 CHRONIC LEFT-SIDED LOW BACK PAIN WITHOUT SCIATICA: ICD-10-CM

## 2025-01-08 DIAGNOSIS — G89.29 CHRONIC LEFT-SIDED LOW BACK PAIN WITHOUT SCIATICA: ICD-10-CM

## 2025-01-08 PROBLEM — Z98.84 HISTORY OF GASTRIC BYPASS: Status: ACTIVE | Noted: 2025-01-08

## 2025-01-08 PROBLEM — M85.89 OSTEOPENIA OF MULTIPLE SITES: Status: ACTIVE | Noted: 2025-01-08

## 2025-01-08 PROBLEM — K57.90 DIVERTICULOSIS: Status: ACTIVE | Noted: 2025-01-08

## 2025-01-08 PROBLEM — E78.5 HYPERLIPIDEMIA LDL GOAL <100: Status: ACTIVE | Noted: 2025-01-08

## 2025-01-08 PROCEDURE — 99203 OFFICE O/P NEW LOW 30 MIN: CPT | Performed by: NURSE PRACTITIONER

## 2025-01-08 NOTE — ASSESSMENT & PLAN NOTE
Hx lumbar disc herniation with radiculopathy s/p left hemilaminectomy at L5/S1 in 2021.   Chronic pain managed with chiropractor as well as gabapentin and MMJ

## 2025-01-08 NOTE — ASSESSMENT & PLAN NOTE
Started losing weight post-COVID infection in 2021 unintentionally; correlated with anosmia. Per chart review ~45lb weight loss.   Diet:  picks for breakfast/lunch but eats a full dinner.  Does feel hunger but with early satiety.  Denies abdominal pain, dysphagia, N/V.  Bowels moving daily soft/formed, without concern for constipation; denies melena/mucoid. 1 cup coffee daily, rare ETOH.   Has used MMJ for years to manage chronic arthritic pain. Oral estrogen for symptomatic menopause.  OTC medications include MVI, Vitamin C, D, magnesium.  No longer taking vitamin E since weight loss.  Followed by [facility].    4/10/2023: CTA A/P: Fatty liver, diverticulosis, mild right-sided colonic wall thickening without pericolonic edema or straining nonspecific.  Lymph nodes were normal.  6/2023 Colonoscopy/EGD without causation.  Bloodwork 11/20/24 all negative including: Sed rate, HCV, HIV. Elastase stool cx =606.  10/14/24: CBC/CMP negative; electrophoresis with albumin 3.9 otherwise stable.  TSH 1.69.  -tTG IgA autoAb 8  -NAD, VSS.  No thyromegaly.  S1S2 bradycardia, apical rate 48.  Lungs CTA.  Abdomen flat, NT/ND hyperactive BS (patient reports this as chronic).  No hepatosplenomegaly.  +pulses, no edema. Pale.   -Discussed causation may be multifactorial.  Has been on  oral estrogen as well as use of cannabis products for years possibly contributing.  Discussed overall diet of 1 full meal daily otherwise grazing likely also partially causing weight loss.  She will reach out to her GYN regarding oral estrogen and weight loss as well as reconsider her MMJ use.  She was advised to increase overall oral intake throughout the day.

## 2025-01-08 NOTE — PROGRESS NOTES
[facility]   [place]    Assessment/Plan:   1. Encounter to establish care with new doctor  2. Primary hypertension  Assessment & Plan:  Well controlled on Toprol 50mg daily.  Denies chest pressure/pain/palpitations/dyspnea.    3. Chronic left-sided low back pain without sciatica  Assessment & Plan:  Hx lumbar disc herniation with radiculopathy s/p left hemilaminectomy at L5/S1 in 2021.   Chronic pain managed with chiropractor as well as gabapentin and MMJ  4. Unintentional weight loss  Assessment & Plan:  Started losing weight post-COVID infection in 2021 unintentionally; correlated with anosmia. Per chart review ~45lb weight loss.   Diet:  picks for breakfast/lunch but eats a full dinner.  Does feel hunger but with early satiety.  Denies abdominal pain, dysphagia, N/V.  Bowels moving daily soft/formed, without concern for constipation; denies melena/mucoid. 1 cup coffee daily, rare ETOH.   Has used MMJ for years to manage chronic arthritic pain. Oral estrogen for symptomatic menopause.  OTC medications include MVI, Vitamin C, D, magnesium.  No longer taking vitamin E since weight loss.  Followed by [facility].    4/10/2023: CTA A/P: Fatty liver, diverticulosis, mild right-sided colonic wall thickening without pericolonic edema or straining nonspecific.  Lymph nodes were normal.  6/2023 Colonoscopy/EGD without causation.  Bloodwork 11/20/24 all negative including: Sed rate, HCV, HIV. Elastase stool cx =606.  10/14/24: CBC/CMP negative; electrophoresis with albumin 3.9 otherwise stable.  TSH 1.69.  -tTG IgA autoAb 8  -NAD, VSS.  No thyromegaly.  S1S2 bradycardia, apical rate 48.  Lungs CTA.  Abdomen flat, NT/ND hyperactive BS (patient reports this as chronic).  No hepatosplenomegaly.  +pulses, no edema. Pale.   -Discussed causation may be multifactorial.  Has been on  oral estrogen as well as use of cannabis products for years possibly contributing.  Discussed overall diet of 1 full meal daily  otherwise grazing likely also partially causing weight loss.  She will reach out to her GYN regarding oral estrogen and weight loss as well as reconsider her MMJ use.  She was advised to increase overall oral intake throughout the day.        Increase overall oral intake throughout the day.  Consider MMJ as possible appetite suppressant vs stimulant? Discuss with GYN if oral estrogen could be contributing to weight loss?  No follow-ups on file.  Patient may call or return to office with any questions or concerns.     ______________________________________________________________________  Subjective:     Patient ID: Analia Locke is a 64 y.o. female.  Analia Locke  Chief Complaint   Patient presents with    Establish Care     Concerned about weight loss     Here to establish care; [facility]      PMH: HTN, gastric bypass 2002, Hypercholesterolemia, chronic back pain s/p multiple surgeries, COVID 19, insomnia, osteopenia, unintentional weight loss.                          The following portions of the patient's history were reviewed and updated as appropriate: allergies, current medications, past family history, past medical history, past social history, past surgical history, and problem list.    Review of Systems   Constitutional:  Positive for unexpected weight change. Negative for activity change, appetite change, chills, fatigue and fever.   HENT: Negative.  Negative for congestion, ear pain, postnasal drip, sinus pain and trouble swallowing.         Anosmia   Eyes: Negative.    Respiratory: Negative.  Negative for cough, chest tightness, shortness of breath and wheezing.    Cardiovascular: Negative.  Negative for chest pain, palpitations and leg swelling.   Gastrointestinal: Negative.  Negative for abdominal distention, abdominal pain, blood in stool, constipation, diarrhea, nausea and vomiting.        Early satiety   Endocrine: Negative.    Genitourinary: Negative.  Negative for difficulty  urinating, dysuria and vaginal bleeding.   Musculoskeletal:  Positive for back pain.   Skin: Negative.    Allergic/Immunologic: Negative.  Negative for immunocompromised state.   Neurological: Negative.  Negative for dizziness, weakness and light-headedness.   Hematological: Negative.    Psychiatric/Behavioral:  Positive for sleep disturbance. Negative for dysphoric mood. The patient is not nervous/anxious.         Insomnia for the past year with inability to stay asleep.  Denies paroxysmal dyspnea nor waking up in panic attack.           Objective:      Vitals:    01/08/25 1156   BP: 122/68   Pulse: 55   SpO2: 97%      Physical Exam  Vitals and nursing note reviewed.   Constitutional:       Appearance: Normal appearance.   HENT:      Head: Normocephalic and atraumatic.      Right Ear: Tympanic membrane, ear canal and external ear normal.      Left Ear: Tympanic membrane, ear canal and external ear normal.      Nose: Nose normal.      Mouth/Throat:      Mouth: Mucous membranes are moist.      Pharynx: Oropharynx is clear.   Eyes:      Extraocular Movements: Extraocular movements intact.      Conjunctiva/sclera: Conjunctivae normal.      Pupils: Pupils are equal, round, and reactive to light.   Cardiovascular:      Rate and Rhythm: Regular rhythm. Bradycardia present.      Pulses: Normal pulses.      Heart sounds: Normal heart sounds.      Comments: Apical rate 48  Pulmonary:      Effort: Pulmonary effort is normal.      Breath sounds: Normal breath sounds.   Abdominal:      General: Bowel sounds are increased. There is no distension.      Palpations: Abdomen is soft. There is no hepatomegaly, splenomegaly or mass.      Tenderness: There is no abdominal tenderness. There is no guarding.      Hernia: No hernia is present.   Musculoskeletal:         General: Normal range of motion.      Cervical back: Normal range of motion and neck supple.   Skin:     General: Skin is warm and dry.   Neurological:      General: No  "focal deficit present.      Mental Status: She is alert and oriented to person, place, and time.   Psychiatric:         Mood and Affect: Mood normal.         Behavior: Behavior normal.         Thought Content: Thought content normal.         Judgment: Judgment normal.           Portions of the record may have been created with voice recognition software. Occasional wrong word or \"sound alike\" substitutions may have occurred due to the inherent limitations of voice recognition software. Please review the chart carefully and recognize, using context, where substitutions/typographical errors may have occurred.       "

## 2025-01-09 ENCOUNTER — TELEPHONE (OUTPATIENT)
Dept: ADMINISTRATIVE | Facility: OTHER | Age: 65
End: 2025-01-09

## 2025-01-09 NOTE — TELEPHONE ENCOUNTER
Upon review of the In Basket request we were able to locate, review, and update the patient chart as requested for CRC: Colonoscopy, DEXA Scan, Hepatitis C , and HIV.    Any additional questions or concerns should be emailed to the Practice Liaisons via the appropriate education email address, please do not reply via In Basket.    Thank you  Angel Neville MA   [facility]

## 2025-01-09 NOTE — TELEPHONE ENCOUNTER
----- Message from KAYDEN Power sent at 1/8/2025  4:39 PM EST -----  Good afternoon.  Please update care gaps as patient has had a DEXA scan in  May 2024, colonoscopy June 2023, HIV as well as HCV negative as of 11/20/2024.  Thank you so much

## 2025-06-02 ENCOUNTER — DOCUMENTATION (OUTPATIENT)
Dept: ADMINISTRATIVE | Facility: OTHER | Age: 65
End: 2025-06-02

## 2025-06-02 VITALS — SYSTOLIC BLOOD PRESSURE: 122 MMHG | DIASTOLIC BLOOD PRESSURE: 58 MMHG

## 2025-06-02 NOTE — PROGRESS NOTES
Rich Chávez,    I have intermittently taking my blood pressure here’s the results.   May 17. 10:50am 115/66.     2:30pm 73/39  May 20. 1:00pm 130/62  May23. 1:40pm 103/49  May25. 3:20pm 118/53  May 27 5:41pm 122/58

## 2025-06-05 ENCOUNTER — PATIENT MESSAGE (OUTPATIENT)
Dept: FAMILY MEDICINE CLINIC | Facility: HOSPITAL | Age: 65
End: 2025-06-05

## 2025-06-05 DIAGNOSIS — I10 PRIMARY HYPERTENSION: Primary | ICD-10-CM

## 2025-06-05 RX ORDER — METOPROLOL SUCCINATE 25 MG/1
25 TABLET, EXTENDED RELEASE ORAL DAILY
Qty: 90 TABLET | Refills: 3 | Status: SHIPPED | OUTPATIENT
Start: 2025-06-05 | End: 2025-06-09 | Stop reason: ALTCHOICE

## 2025-06-06 RX ORDER — METOPROLOL SUCCINATE 50 MG/1
50 TABLET, EXTENDED RELEASE ORAL DAILY
Refills: 0 | OUTPATIENT
Start: 2025-06-06

## 2025-06-09 ENCOUNTER — OFFICE VISIT (OUTPATIENT)
Dept: FAMILY MEDICINE CLINIC | Facility: HOSPITAL | Age: 65
End: 2025-06-09
Payer: COMMERCIAL

## 2025-06-09 VITALS
BODY MASS INDEX: 21.14 KG/M2 | WEIGHT: 112 LBS | HEART RATE: 50 BPM | HEIGHT: 61 IN | SYSTOLIC BLOOD PRESSURE: 120 MMHG | DIASTOLIC BLOOD PRESSURE: 70 MMHG | OXYGEN SATURATION: 99 %

## 2025-06-09 DIAGNOSIS — I49.3 SYMPTOMATIC PVCS: Primary | ICD-10-CM

## 2025-06-09 DIAGNOSIS — E78.5 HYPERLIPIDEMIA LDL GOAL <100: ICD-10-CM

## 2025-06-09 DIAGNOSIS — M25.50 POLYARTHRALGIA: ICD-10-CM

## 2025-06-09 DIAGNOSIS — R55 VASOVAGAL SYNCOPE: ICD-10-CM

## 2025-06-09 DIAGNOSIS — R63.4 UNINTENTIONAL WEIGHT LOSS: ICD-10-CM

## 2025-06-09 DIAGNOSIS — I10 PRIMARY HYPERTENSION: ICD-10-CM

## 2025-06-09 PROBLEM — I48.0 PAF (PAROXYSMAL ATRIAL FIBRILLATION) (HCC): Status: ACTIVE | Noted: 2025-06-09

## 2025-06-09 LAB — VENTRICULAR RATE: 40 DEGREES

## 2025-06-09 PROCEDURE — 93000 ELECTROCARDIOGRAM COMPLETE: CPT | Performed by: NURSE PRACTITIONER

## 2025-06-09 PROCEDURE — 99214 OFFICE O/P EST MOD 30 MIN: CPT | Performed by: NURSE PRACTITIONER

## 2025-06-09 RX ORDER — METOPROLOL SUCCINATE 25 MG/1
25 TABLET, EXTENDED RELEASE ORAL DAILY
Qty: 30 TABLET | Refills: 11 | Status: SHIPPED | OUTPATIENT
Start: 2025-06-09 | End: 2025-06-17

## 2025-06-09 NOTE — ASSESSMENT & PLAN NOTE
Has been on Toprol 50 mg p.o. daily for over a decade for what appears to be symptomatic PVCs per chart review vs PAF (EKG 3/19/06819)  Started losing weight post COVID infection in 2021 unintentionally with associated anosmia ageusia (almost 50lbs) hx gastric bypass in 2002.  Is trying to push oral intake however has early satiety:   breakfast- one egg   lunch:  sandwich.  Dinner:  full meal.  Drinking water.  Intermittent abdominal pain with chronic back pain.  Did have diarrhea with N/V (vasovagal) on toilet x2 in the last 2 weeks (both times early evening).  Denies melena/mucoid stools.  1 cup coffee daily, rare ETOH.  Increased anxiety when trying to stop MMJ.  No longer smoking.  Taking gummies for sleep.  Continues oral estrogen for symptomatic menopause per GYN.    -followed by [facility]: Colonoscopy/EGD in 2023 unremarkable.  CTA A/P: Fatty liver, diverticulosis, mild right sided colonic wall thickening without pericolonic edema/stranding.  -Bloodwork 11/20/24 all negative including: Sed rate, HCV, HIV. Elastase stool cx =606.  10/14/24: CBC/CMP negative; electrophoresis with albumin 3.9 otherwise stable.  TSH 1.69.  -tTG IgA autoAb 8  - NAD however anxious on upon assessment.  Bradycardic in office (took Toprol 25 mg at 8 AM).  EKG marked sinus bradycardia with HR 41:   ms, QRS 80ms, QTc 417.  No murmur or gallop nor ectopy on exam.  Carotids without bruit/thrill.  Lungs CTA.  Abdomen soft, nontender, BS X4.  No organomegaly.  + Pulses no edema pale.  - Orthostatics in office negative:    Laying: HR 45, Bp 148/78 O2 99% RA  Sitting HR 48, Bp 144/78 O2 95% RA  Standing HR 52, Bp 142/70 with O2 96% RA  -discussed symptoms could be medication induced:  will hold toprol 25mg daily for HR <50.    -check holter monitor and echocardiogram.

## 2025-06-09 NOTE — ASSESSMENT & PLAN NOTE
History of hypercholesterolemia not currently on any medicinal intervention.  Will recheck lipid panel.

## 2025-06-09 NOTE — ASSESSMENT & PLAN NOTE
History of hypertension however with significant weight loss in the past 3 years while on Toprol 50 mg p.o. daily.  Was recently decreased to Toprol 25 mg p.o. daily due to what appears to be symptomatic bradycardia.  See symptomatic PVCs A/P.

## 2025-06-09 NOTE — PROGRESS NOTES
[facility]   Poornima MONIQUE    Assessment/Plan:   1. Symptomatic PVCs  Assessment & Plan:  Has been on Toprol 50 mg p.o. daily for over a decade for what appears to be symptomatic PVCs per chart review vs PAF (EKG 3/19/48051)  Started losing weight post COVID infection in 2021 unintentionally with associated anosmia ageusia (almost 50lbs) hx gastric bypass in 2002.  Is trying to push oral intake however has early satiety:   breakfast- one egg   lunch:  sandwich.  Dinner:  full meal.  Drinking water.  Intermittent abdominal pain with chronic back pain.  Did have diarrhea with N/V (vasovagal) on toilet x2 in the last 2 weeks (both times early evening).  Denies melena/mucoid stools.  1 cup coffee daily, rare ETOH.  Increased anxiety when trying to stop MMJ.  No longer smoking.  Taking gummies for sleep.  Continues oral estrogen for symptomatic menopause per GYN.    -followed by [facility]: Colonoscopy/EGD in 2023 unremarkable.  CTA A/P: Fatty liver, diverticulosis, mild right sided colonic wall thickening without pericolonic edema/stranding.  -Bloodwork 11/20/24 all negative including: Sed rate, HCV, HIV. Elastase stool cx =606.  10/14/24: CBC/CMP negative; electrophoresis with albumin 3.9 otherwise stable.  TSH 1.69.  -tTG IgA autoAb 8  - NAD however anxious on upon assessment.  Bradycardic in office (took Toprol 25 mg at 8 AM).  EKG marked sinus bradycardia with HR 41:   ms, QRS 80ms, QTc 417.  No murmur or gallop nor ectopy on exam.  Carotids without bruit/thrill.  Lungs CTA.  Abdomen soft, nontender, BS X4.  No organomegaly.  + Pulses no edema pale.  - Orthostatics in office negative:    Laying: HR 45, Bp 148/78 O2 99% RA  Sitting HR 48, Bp 144/78 O2 95% RA  Standing HR 52, Bp 142/70 with O2 96% RA  -discussed symptoms could be medication induced:  will hold toprol 25mg daily for HR <50.    -check holter monitor and echocardiogram.   Orders:  -     Holter monitor; Future; Expected date:  06/09/2025  -     POCT ECG  -     Echo complete w/ contrast if indicated; Future; Expected date: 06/09/2025  -     metoprolol succinate (TOPROL-XL) 25 mg 24 hr tablet; Take 1 tablet (25 mg total) by mouth in the morning.  2. Primary hypertension  Assessment & Plan:  History of hypertension however with significant weight loss in the past 3 years while on Toprol 50 mg p.o. daily.  Was recently decreased to Toprol 25 mg p.o. daily due to what appears to be symptomatic bradycardia.  See symptomatic PVCs A/P.  Orders:  -     Holter monitor; Future; Expected date: 06/09/2025  -     POCT ECG  -     Echo complete w/ contrast if indicated; Future; Expected date: 06/09/2025  -     metoprolol succinate (TOPROL-XL) 25 mg 24 hr tablet; Take 1 tablet (25 mg total) by mouth in the morning.  3. Hyperlipidemia LDL goal <100  Assessment & Plan:  History of hypercholesterolemia not currently on any medicinal intervention.  Will recheck lipid panel.  Orders:  -     Lipid panel; Future  4. Unintentional weight loss  -     Cortisol Level, AM Specimen; Future; Expected date: Collect anytime  -     ACTH; Future; Expected date: Collect anytime  -     CBC and Platelet; Future; Expected date: 06/09/2025  -     Comprehensive Metabolic Panel; Future; Expected date: 06/09/2025  -     RHEUMATOID FACTOR; Future; Expected date: 06/09/2025  -     Cyclic citrul peptide antibody, IgG; Future; Expected date: 06/09/2025  -     Sjogren's Antibodies; Future; Expected date: 06/09/2025  -     Uric acid; Future; Expected date: 06/09/2025  -     Lyme Total AB W Reflex to IGM/IGG; Future; Expected date: 06/09/2025  -     C-reactive protein; Future; Expected date: 06/09/2025  -     Sedimentation rate, automated; Future; Expected date: 06/09/2025  -     ROSA M Screen w/Reflex Cascade; Future; Expected date: 06/09/2025  -     Chronic Hepatitis Panel; Future; Expected date: 06/09/2025  -     Vitamin D 25 hydroxy; Future; Expected date: 06/09/2025  5.  Polyarthralgia  -     Cortisol Level, AM Specimen; Future; Expected date: Collect anytime  -     ACTH; Future; Expected date: Collect anytime  -     CBC and Platelet; Future; Expected date: 06/09/2025  -     Comprehensive Metabolic Panel; Future; Expected date: 06/09/2025  -     RHEUMATOID FACTOR; Future; Expected date: 06/09/2025  -     Cyclic citrul peptide antibody, IgG; Future; Expected date: 06/09/2025  -     Sjogren's Antibodies; Future; Expected date: 06/09/2025  -     Uric acid; Future; Expected date: 06/09/2025  -     Lyme Total AB W Reflex to IGM/IGG; Future; Expected date: 06/09/2025  -     C-reactive protein; Future; Expected date: 06/09/2025  -     Sedimentation rate, automated; Future; Expected date: 06/09/2025  -     ROSA M Screen w/Reflex Cascade; Future; Expected date: 06/09/2025  -     Chronic Hepatitis Panel; Future; Expected date: 06/09/2025  -     Vitamin D 25 hydroxy; Future; Expected date: 06/09/2025  6. Vasovagal syncope  -     Cortisol Level, AM Specimen; Future; Expected date: Collect anytime  -     ACTH; Future; Expected date: Collect anytime  -     CBC and Platelet; Future; Expected date: 06/09/2025  -     Comprehensive Metabolic Panel; Future; Expected date: 06/09/2025  -     RHEUMATOID FACTOR; Future; Expected date: 06/09/2025  -     Cyclic citrul peptide antibody, IgG; Future; Expected date: 06/09/2025  -     Sjogren's Antibodies; Future; Expected date: 06/09/2025  -     Uric acid; Future; Expected date: 06/09/2025  -     Lyme Total AB W Reflex to IGM/IGG; Future; Expected date: 06/09/2025  -     C-reactive protein; Future; Expected date: 06/09/2025  -     Sedimentation rate, automated; Future; Expected date: 06/09/2025  -     ROSA M Screen w/Reflex Cascade; Future; Expected date: 06/09/2025  -     Chronic Hepatitis Panel; Future; Expected date: 06/09/2025  -     Vitamin D 25 hydroxy; Future; Expected date: 06/09/2025  -     Holter monitor; Future; Expected date: 06/09/2025  -     POCT  ECG      Continue Toprol 25mg daily but hold if heart rate less than 50.   Blood work as ordered.    Schedule echocardiogram as well as Holter monitor.   Return in about 1 week (around 6/16/2025) for F/U after bloodwork.  Patient may call or return to office with any questions or concerns.     ______________________________________________________________________  Subjective:     Patient ID: Analia Locke is a 65 y.o. female.  Analia Locke  Chief Complaint   Patient presents with    Weight Loss     Pt has been losing weight for the last 5 years - started off 175-180.      Per A/P                   The following portions of the patient's history were reviewed and updated as appropriate: allergies, current medications, past family history, past medical history, past social history, past surgical history, and problem list.    Review of Systems   Constitutional:  Positive for appetite change, fatigue and unexpected weight change. Negative for activity change, chills and fever.   HENT: Negative.  Negative for congestion, ear pain, postnasal drip, sinus pain and trouble swallowing.         Anosmia/ageusia     Eyes: Negative.    Respiratory: Negative.  Negative for cough, chest tightness, shortness of breath and wheezing.    Cardiovascular: Negative.  Negative for chest pain, palpitations and leg swelling.   Gastrointestinal:  Positive for abdominal pain, constipation, diarrhea and nausea. Negative for blood in stool.        Vomit x2 associated with vasovagal response  Early satiety   Endocrine: Positive for heat intolerance and polyuria.   Genitourinary: Negative.  Negative for difficulty urinating, dysuria and vaginal bleeding.   Musculoskeletal:  Positive for arthralgias and back pain.        Chronic back pain   Skin: Negative.    Allergic/Immunologic: Negative.  Negative for environmental allergies, food allergies and immunocompromised state.   Neurological:  Positive for syncope. Negative for dizziness,  weakness, light-headedness, numbness and headaches.        Positional dizziness/lightheadedness.  Denies vertigo.    Hematological: Negative.    Psychiatric/Behavioral:  Positive for sleep disturbance. The patient is nervous/anxious.         Ongoing insomnia for the past year with inability to stay asleep.  Does endorse waking up in a panic attack at times.  No snoring nor apneic periods or paroxysmal dyspnea         Objective:      Vitals:    06/09/25 1035   BP: 120/70   Pulse: (!) 50   SpO2: 99%      Physical Exam  Vitals and nursing note reviewed.   Constitutional:       General: She is awake.      Appearance: Normal appearance.   HENT:      Head: Normocephalic and atraumatic.      Right Ear: Tympanic membrane, ear canal and external ear normal.      Left Ear: Tympanic membrane, ear canal and external ear normal.      Nose: Nose normal.      Mouth/Throat:      Mouth: Mucous membranes are moist.      Pharynx: Oropharynx is clear.     Eyes:      Extraocular Movements: Extraocular movements intact.      Conjunctiva/sclera: Conjunctivae normal.      Pupils: Pupils are equal, round, and reactive to light.       Cardiovascular:      Rate and Rhythm: Regular rhythm. Bradycardia present.      Pulses: Normal pulses.      Heart sounds: Normal heart sounds.      Comments: Apical rate 40.   Pulmonary:      Effort: Pulmonary effort is normal.      Breath sounds: Normal breath sounds.   Abdominal:      General: Bowel sounds are normal.      Palpations: Abdomen is soft.     Musculoskeletal:         General: Normal range of motion.      Cervical back: Normal range of motion and neck supple.      Right lower leg: No edema.      Left lower leg: No edema.     Skin:     General: Skin is warm and dry.     Neurological:      General: No focal deficit present.      Mental Status: She is alert and oriented to person, place, and time.     Psychiatric:         Attention and Perception: Attention and perception normal.         Mood and  "Affect: Mood is anxious.         Speech: Speech normal.         Behavior: Behavior normal. Behavior is cooperative.         Thought Content: Thought content normal.         Cognition and Memory: Cognition and memory normal.         Judgment: Judgment normal.           Portions of the record may have been created with voice recognition software. Occasional wrong word or \"sound alike\" substitutions may have occurred due to the inherent limitations of voice recognition software. Please review the chart carefully and recognize, using context, where substitutions/typographical errors may have occurred.       "

## 2025-06-09 NOTE — PATIENT INSTRUCTIONS
Continue Toprol 25mg daily but hold if heart rate less than 50.   Blood work as ordered.    Schedule echocardiogram as well as Holter monitor.

## 2025-06-10 ENCOUNTER — RESULTS FOLLOW-UP (OUTPATIENT)
Dept: FAMILY MEDICINE CLINIC | Facility: HOSPITAL | Age: 65
End: 2025-06-10

## 2025-06-10 ENCOUNTER — APPOINTMENT (OUTPATIENT)
Dept: LAB | Facility: CLINIC | Age: 65
End: 2025-06-10
Payer: COMMERCIAL

## 2025-06-10 DIAGNOSIS — R63.4 UNINTENTIONAL WEIGHT LOSS: ICD-10-CM

## 2025-06-10 DIAGNOSIS — M25.50 POLYARTHRALGIA: ICD-10-CM

## 2025-06-10 DIAGNOSIS — E78.5 HYPERLIPIDEMIA LDL GOAL <100: ICD-10-CM

## 2025-06-10 DIAGNOSIS — R55 VASOVAGAL SYNCOPE: ICD-10-CM

## 2025-06-10 LAB
25(OH)D3 SERPL-MCNC: 45.2 NG/ML (ref 30–100)
ALBUMIN SERPL BCG-MCNC: 4.4 G/DL (ref 3.5–5)
ALP SERPL-CCNC: 84 U/L (ref 34–104)
ALT SERPL W P-5'-P-CCNC: 39 U/L (ref 7–52)
ANION GAP SERPL CALCULATED.3IONS-SCNC: 5 MMOL/L (ref 4–13)
AST SERPL W P-5'-P-CCNC: 36 U/L (ref 13–39)
B BURGDOR IGG+IGM SER QL IA: NEGATIVE
BILIRUB SERPL-MCNC: 0.5 MG/DL (ref 0.2–1)
BUN SERPL-MCNC: 15 MG/DL (ref 5–25)
CALCIUM SERPL-MCNC: 9.4 MG/DL (ref 8.4–10.2)
CHLORIDE SERPL-SCNC: 103 MMOL/L (ref 96–108)
CHOLEST SERPL-MCNC: 223 MG/DL (ref ?–200)
CO2 SERPL-SCNC: 29 MMOL/L (ref 21–32)
CORTIS AM PEAK SERPL-MCNC: 9.6 UG/DL (ref 6.7–22.6)
CREAT SERPL-MCNC: 0.73 MG/DL (ref 0.6–1.3)
CRP SERPL QL: <1 MG/L
ERYTHROCYTE [DISTWIDTH] IN BLOOD BY AUTOMATED COUNT: 12.5 % (ref 11.6–15.1)
ERYTHROCYTE [SEDIMENTATION RATE] IN BLOOD: 12 MM/HOUR (ref 0–29)
GFR SERPL CREATININE-BSD FRML MDRD: 86 ML/MIN/1.73SQ M
GLUCOSE P FAST SERPL-MCNC: 82 MG/DL (ref 65–99)
HCT VFR BLD AUTO: 37.9 % (ref 34.8–46.1)
HDLC SERPL-MCNC: 66 MG/DL
HGB BLD-MCNC: 12.1 G/DL (ref 11.5–15.4)
LDLC SERPL CALC-MCNC: 137 MG/DL (ref 0–100)
MCH RBC QN AUTO: 30.6 PG (ref 26.8–34.3)
MCHC RBC AUTO-ENTMCNC: 31.9 G/DL (ref 31.4–37.4)
MCV RBC AUTO: 96 FL (ref 82–98)
NONHDLC SERPL-MCNC: 157 MG/DL
PLATELET # BLD AUTO: 297 THOUSANDS/UL (ref 149–390)
PMV BLD AUTO: 10.3 FL (ref 8.9–12.7)
POTASSIUM SERPL-SCNC: 4.6 MMOL/L (ref 3.5–5.3)
PROT SERPL-MCNC: 7.3 G/DL (ref 6.4–8.4)
RBC # BLD AUTO: 3.96 MILLION/UL (ref 3.81–5.12)
RHEUMATOID FACT SERPL-ACNC: <10 IU/ML
SODIUM SERPL-SCNC: 137 MMOL/L (ref 135–147)
TRIGL SERPL-MCNC: 101 MG/DL (ref ?–150)
URATE SERPL-MCNC: 3.8 MG/DL (ref 2–7.5)
WBC # BLD AUTO: 5.19 THOUSAND/UL (ref 4.31–10.16)

## 2025-06-10 PROCEDURE — 86431 RHEUMATOID FACTOR QUANT: CPT

## 2025-06-10 PROCEDURE — 86704 HEP B CORE ANTIBODY TOTAL: CPT

## 2025-06-10 PROCEDURE — 86200 CCP ANTIBODY: CPT

## 2025-06-10 PROCEDURE — 82533 TOTAL CORTISOL: CPT

## 2025-06-10 PROCEDURE — 86140 C-REACTIVE PROTEIN: CPT

## 2025-06-10 PROCEDURE — 86803 HEPATITIS C AB TEST: CPT

## 2025-06-10 PROCEDURE — 86225 DNA ANTIBODY NATIVE: CPT

## 2025-06-10 PROCEDURE — 80053 COMPREHEN METABOLIC PANEL: CPT

## 2025-06-10 PROCEDURE — 85652 RBC SED RATE AUTOMATED: CPT

## 2025-06-10 PROCEDURE — 82306 VITAMIN D 25 HYDROXY: CPT

## 2025-06-10 PROCEDURE — 36415 COLL VENOUS BLD VENIPUNCTURE: CPT

## 2025-06-10 PROCEDURE — 84550 ASSAY OF BLOOD/URIC ACID: CPT

## 2025-06-10 PROCEDURE — 86235 NUCLEAR ANTIGEN ANTIBODY: CPT

## 2025-06-10 PROCEDURE — 86618 LYME DISEASE ANTIBODY: CPT

## 2025-06-10 PROCEDURE — 82024 ASSAY OF ACTH: CPT

## 2025-06-10 PROCEDURE — 80061 LIPID PANEL: CPT

## 2025-06-10 PROCEDURE — 87340 HEPATITIS B SURFACE AG IA: CPT

## 2025-06-10 PROCEDURE — 86038 ANTINUCLEAR ANTIBODIES: CPT

## 2025-06-10 PROCEDURE — 85027 COMPLETE CBC AUTOMATED: CPT

## 2025-06-10 PROCEDURE — 86705 HEP B CORE ANTIBODY IGM: CPT

## 2025-06-11 ENCOUNTER — HOSPITAL ENCOUNTER (OUTPATIENT)
Dept: NON INVASIVE DIAGNOSTICS | Facility: CLINIC | Age: 65
Discharge: HOME | End: 2025-06-11
Attending: NURSE PRACTITIONER
Payer: COMMERCIAL

## 2025-06-11 DIAGNOSIS — I49.3 SYMPTOMATIC PVCS: ICD-10-CM

## 2025-06-11 DIAGNOSIS — I10 PRIMARY HYPERTENSION: ICD-10-CM

## 2025-06-11 DIAGNOSIS — R55 VASOVAGAL SYNCOPE: ICD-10-CM

## 2025-06-11 LAB
ACTH PLAS-MCNC: 19.9 PG/ML (ref 7.2–63.3)
HBV CORE AB SER QL: NORMAL
HBV CORE IGM SER QL: NORMAL
HBV SURFACE AG SER QL: NORMAL
HCV AB SER QL: NORMAL

## 2025-06-11 PROCEDURE — 93226 XTRNL ECG REC<48 HR SCAN A/R: CPT

## 2025-06-11 PROCEDURE — 93225 XTRNL ECG REC<48 HRS REC: CPT

## 2025-06-13 LAB
CCP AB SER IA-ACNC: 2.2 (ref ?–10)
DSDNA IGG SERPL IA-ACNC: 1.7 IU/ML (ref ?–15)
ENA SS-A AB SER IA-ACNC: <0.5 U/ML (ref ?–10)
ENA SS-B IGG SER IA-ACNC: <0.6 U/ML (ref ?–10)
NUCLEAR IGG SER IA-RTO: 0.2 RATIO (ref ?–1)

## 2025-06-17 ENCOUNTER — OFFICE VISIT (OUTPATIENT)
Dept: FAMILY MEDICINE CLINIC | Facility: HOSPITAL | Age: 65
End: 2025-06-17
Payer: COMMERCIAL

## 2025-06-17 VITALS
OXYGEN SATURATION: 97 % | HEART RATE: 58 BPM | SYSTOLIC BLOOD PRESSURE: 104 MMHG | WEIGHT: 112 LBS | DIASTOLIC BLOOD PRESSURE: 66 MMHG | BODY MASS INDEX: 21.16 KG/M2

## 2025-06-17 DIAGNOSIS — I49.3 SYMPTOMATIC PVCS: ICD-10-CM

## 2025-06-17 DIAGNOSIS — E78.5 HYPERLIPIDEMIA LDL GOAL <100: ICD-10-CM

## 2025-06-17 DIAGNOSIS — I10 PRIMARY HYPERTENSION: Primary | ICD-10-CM

## 2025-06-17 PROCEDURE — 93227 XTRNL ECG REC<48 HR R&I: CPT | Performed by: STUDENT IN AN ORGANIZED HEALTH CARE EDUCATION/TRAINING PROGRAM

## 2025-06-17 PROCEDURE — 99214 OFFICE O/P EST MOD 30 MIN: CPT | Performed by: NURSE PRACTITIONER

## 2025-06-17 RX ORDER — METOPROLOL SUCCINATE 25 MG/1
12.5 TABLET, EXTENDED RELEASE ORAL DAILY PRN
Start: 2025-06-17 | End: 2025-06-17

## 2025-06-17 RX ORDER — METOPROLOL SUCCINATE 25 MG/1
12.5 TABLET, EXTENDED RELEASE ORAL DAILY
Start: 2025-06-17

## 2025-06-17 NOTE — PROGRESS NOTES
[facility]   Poornima MONIQUE    Assessment/Plan:   1. Primary hypertension  Assessment & Plan:  History of hypertension however has had significant weight loss over the past 3 years.  Was seen in the office last week with Toprol decreased to 25 mg p.o. daily.  Has since been taking half tablet (12.5 mg) daily with significant improvement in symptoms (see symptomatic PVCs A/P).  Today denies chest pressure pain palpitation shortness of breath dizziness lightheadedness nor headache.  Most recent blood work WNL.  Will continue Toprol 12.5 mg p.o. daily with hold heart rate less than 50.  Orders:  -     CT coronary calcium score; Future; Expected date: 06/17/2025  -     Ambulatory Referral to Cardiology; Future  -     metoprolol succinate (TOPROL-XL) 25 mg 24 hr tablet; Take 0.5 tablets (12.5 mg total) by mouth daily  2. Symptomatic PVCs  Assessment & Plan:  Here for follow-up.  Reports holding Toprol for 4 days for heart rate less than 50 then had episode of palpitations so restarted Toprol half tablet (12.5 mg) daily with resolution.  Overall feeling much better.  Less fatigue and appetite returning.  No dizziness lightheadedness nor syncopal episodes.  Sleep and anxiety have also improved.  Did take her 12.5 mg of Toprol this morning with apical rate 48 on exam.    -Holter monitor reassuring with 5 PSVT events otherwise predominantly SR/SB without significant ectopy nor arrhythmia.    -Is scheduled for echocardiogram.  -Will continue Toprol 12.5 mg p.o. daily and hold for heart rate less than 50.  Will refer to cardiology while awaiting echocardiogram results.  Orders:  -     CT coronary calcium score; Future; Expected date: 06/17/2025  -     Ambulatory Referral to Cardiology; Future  -     metoprolol succinate (TOPROL-XL) 25 mg 24 hr tablet; Take 0.5 tablets (12.5 mg total) by mouth daily  3. Hyperlipidemia LDL goal <100  Assessment & Plan:   Latest Reference Range & Units 06/10/25 08:44    Cholesterol See Comment mg/dL 223 (H)   Triglycerides See Comment mg/dL 101   HDL >=50 mg/dL 66   Non-HDL Cholesterol mg/dl 157   LDL Calculated 0 - 100 mg/dL 137 (H)   (H): Data is abnormally high    History of hypercholesterolemia with protective HDL.  Declines statin therapy today in office but is agreeable to coronary calcium score testing.  Optimizes nutrition and physical activity.  Rare alcohol use but does use consistent MMJ.  Discussed lifestyle interventions to which she is in agreement to.  Orders:  -     CT coronary calcium score; Future; Expected date: 06/17/2025  -     Ambulatory Referral to Cardiology; Future      Continue Toprol 12.5mg daily. Hold for heart rate less than 50.   Schedule coronary calcium score  Referral to cardiology   No follow-ups on file.  Patient may call or return to office with any questions or concerns.     ______________________________________________________________________  Subjective:     Patient ID: Analia Locke is a 65 y.o. female.  Analia Locke  Chief Complaint   Patient presents with    Follow-up     Lab review     Per A/P.  Reviewed recent bloodwork                        The following portions of the patient's history were reviewed and updated as appropriate: allergies, current medications, past family history, past medical history, past social history, past surgical history, and problem list.    Review of Systems   Constitutional:  Positive for fatigue. Negative for activity change, appetite change, chills, fever and unexpected weight change.        Fatigue and appetite have improved.     HENT: Negative.  Negative for congestion, ear pain, postnasal drip and sinus pain.         Anosmia/ageusia   Eyes: Negative.    Respiratory: Negative.  Negative for cough, chest tightness, shortness of breath and wheezing.    Cardiovascular: Negative.  Negative for chest pain and leg swelling.   Gastrointestinal: Negative.  Negative for abdominal pain, constipation,  diarrhea and nausea.   Endocrine: Negative.    Genitourinary: Negative.  Negative for dysuria.   Musculoskeletal: Negative.    Skin: Negative.    Allergic/Immunologic: Negative.  Negative for immunocompromised state.   Neurological: Negative.  Negative for dizziness, syncope, weakness, light-headedness and numbness.        No further dizziness/lightheadedness.  No syncope   Hematological: Negative.    Psychiatric/Behavioral: Negative.  Negative for sleep disturbance.         Sleep pattern and anxiety  improved         Objective:      Vitals:    06/17/25 1233   BP: 104/66   Pulse: 58   SpO2: 97%      Physical Exam  Vitals and nursing note reviewed.   Constitutional:       General: She is awake.      Appearance: Normal appearance.   HENT:      Head: Normocephalic and atraumatic.      Right Ear: Tympanic membrane, ear canal and external ear normal.      Left Ear: Tympanic membrane, ear canal and external ear normal.      Nose: Nose normal.      Mouth/Throat:      Mouth: Mucous membranes are moist.      Pharynx: Oropharynx is clear.     Eyes:      Extraocular Movements: Extraocular movements intact.      Conjunctiva/sclera: Conjunctivae normal.      Pupils: Pupils are equal, round, and reactive to light.       Cardiovascular:      Rate and Rhythm: Regular rhythm. Bradycardia present.      Pulses: Normal pulses.      Heart sounds: Normal heart sounds.      Comments: Apical rate 48 with taking Toprol 12.5mg this morning.   Pulmonary:      Effort: Pulmonary effort is normal.      Breath sounds: Normal breath sounds. No wheezing, rhonchi or rales.   Abdominal:      General: Bowel sounds are normal.      Palpations: Abdomen is soft.     Musculoskeletal:         General: Normal range of motion.      Cervical back: Normal range of motion and neck supple.      Right lower leg: No edema.      Left lower leg: No edema.     Skin:     General: Skin is warm and dry.     Neurological:      General: No focal deficit present.       "Mental Status: She is alert and oriented to person, place, and time.     Psychiatric:         Attention and Perception: Attention and perception normal.         Mood and Affect: Mood and affect normal. Mood is not anxious.         Speech: Speech normal.         Behavior: Behavior normal. Behavior is cooperative.         Thought Content: Thought content normal.         Cognition and Memory: Cognition and memory normal.         Judgment: Judgment normal.           Portions of the record may have been created with voice recognition software. Occasional wrong word or \"sound alike\" substitutions may have occurred due to the inherent limitations of voice recognition software. Please review the chart carefully and recognize, using context, where substitutions/typographical errors may have occurred.       "

## 2025-06-17 NOTE — ASSESSMENT & PLAN NOTE
Here for follow-up.  Reports holding Toprol for 4 days for heart rate less than 50 then had episode of palpitations so restarted Toprol half tablet (12.5 mg) daily with resolution.  Overall feeling much better.  Less fatigue and appetite returning.  No dizziness lightheadedness nor syncopal episodes.  Sleep and anxiety have also improved.  Did take her 12.5 mg of Toprol this morning with apical rate 48 on exam.    -Holter monitor reassuring with 5 PSVT events otherwise predominantly SR/SB without significant ectopy nor arrhythmia.    -Is scheduled for echocardiogram.  -Will continue Toprol 12.5 mg p.o. daily and hold for heart rate less than 50.  Will refer to cardiology while awaiting echocardiogram results.

## 2025-06-17 NOTE — ASSESSMENT & PLAN NOTE
History of hypertension however has had significant weight loss over the past 3 years.  Was seen in the office last week with Toprol decreased to 25 mg p.o. daily.  Has since been taking half tablet (12.5 mg) daily with significant improvement in symptoms (see symptomatic PVCs A/P).  Today denies chest pressure pain palpitation shortness of breath dizziness lightheadedness nor headache.  Most recent blood work WNL.  Will continue Toprol 12.5 mg p.o. daily with hold heart rate less than 50.

## 2025-06-17 NOTE — ASSESSMENT & PLAN NOTE
Latest Reference Range & Units 06/10/25 08:44   Cholesterol See Comment mg/dL 223 (H)   Triglycerides See Comment mg/dL 101   HDL >=50 mg/dL 66   Non-HDL Cholesterol mg/dl 157   LDL Calculated 0 - 100 mg/dL 137 (H)   (H): Data is abnormally high    History of hypercholesterolemia with protective HDL.  Declines statin therapy today in office but is agreeable to coronary calcium score testing.  Optimizes nutrition and physical activity.  Rare alcohol use but does use consistent MMJ.  Discussed lifestyle interventions to which she is in agreement to.

## 2025-06-17 NOTE — PATIENT INSTRUCTIONS
Continue Toprol 12.5mg daily. Hold for heart rate less than 50.   Schedule coronary calcium score  Referral to cardiology

## 2025-06-26 ENCOUNTER — HOSPITAL ENCOUNTER (OUTPATIENT)
Dept: NON INVASIVE DIAGNOSTICS | Facility: HOSPITAL | Age: 65
Discharge: HOME | End: 2025-06-26
Attending: NURSE PRACTITIONER
Payer: COMMERCIAL

## 2025-06-26 VITALS
DIASTOLIC BLOOD PRESSURE: 66 MMHG | WEIGHT: 112 LBS | BODY MASS INDEX: 21.14 KG/M2 | HEIGHT: 61 IN | SYSTOLIC BLOOD PRESSURE: 104 MMHG | HEART RATE: 58 BPM

## 2025-06-26 DIAGNOSIS — I49.3 SYMPTOMATIC PVCS: ICD-10-CM

## 2025-06-26 DIAGNOSIS — I10 PRIMARY HYPERTENSION: ICD-10-CM

## 2025-06-26 LAB
AORTIC ROOT: 3.1 CM
ASCENDING AORTA: 3 CM
BSA FOR ECHO PROCEDURE: 1.48 M2
E WAVE DECELERATION TIME: 300 MS
E/A RATIO: 1.13
FRACTIONAL SHORTENING: 36 (ref 28–44)
INTERVENTRICULAR SEPTUM IN DIASTOLE (PARASTERNAL SHORT AXIS VIEW): 1 CM
INTERVENTRICULAR SEPTUM: 1 CM (ref 0.6–1.1)
LAAS-AP2: 14.7 CM2
LAAS-AP4: 16.2 CM2
LEFT ATRIUM SIZE: 3.4 CM
LEFT ATRIUM VOLUME (MOD BIPLANE): 38 ML
LEFT ATRIUM VOLUME INDEX (MOD BIPLANE): 25.7 ML/M2
LEFT INTERNAL DIMENSION IN SYSTOLE: 2.9 CM (ref 2.1–4)
LEFT VENTRICULAR INTERNAL DIMENSION IN DIASTOLE: 4.5 CM (ref 3.5–6)
LEFT VENTRICULAR POSTERIOR WALL IN END DIASTOLE: 0.9 CM
LEFT VENTRICULAR STROKE VOLUME: 62 ML
LV EF US.2D.A4C+ESTIMATED: 70 %
LVSV (TEICH): 62 ML
MV E'TISSUE VEL-LAT: 11 CM/S
MV E'TISSUE VEL-SEP: 11 CM/S
MV PEAK A VEL: 0.85 M/S
MV PEAK E VEL: 96 CM/S
MV STENOSIS PRESSURE HALF TIME: 87 MS
MV VALVE AREA P 1/2 METHOD: 2.53
RIGHT ATRIUM AREA SYSTOLE A4C: 9.5 CM2
RIGHT VENTRICLE ID DIMENSION: 2.4 CM
SL CV LEFT ATRIUM LENGTH A2C: 4.9 CM
SL CV LV EF: 63
SL CV PED ECHO LEFT VENTRICLE DIASTOLIC VOLUME (MOD BIPLANE) 2D: 95 ML
SL CV PED ECHO LEFT VENTRICLE SYSTOLIC VOLUME (MOD BIPLANE) 2D: 32 ML
TR MAX PG: 18 MMHG
TR PEAK VELOCITY: 2.1 M/S
TRICUSPID ANNULAR PLANE SYSTOLIC EXCURSION: 2.8 CM
TRICUSPID VALVE PEAK REGURGITATION VELOCITY: 2.14 M/S

## 2025-06-26 PROCEDURE — 93306 TTE W/DOPPLER COMPLETE: CPT

## 2025-06-26 PROCEDURE — 93306 TTE W/DOPPLER COMPLETE: CPT | Performed by: INTERNAL MEDICINE
